# Patient Record
Sex: MALE | Race: WHITE | Employment: OTHER | URBAN - METROPOLITAN AREA
[De-identification: names, ages, dates, MRNs, and addresses within clinical notes are randomized per-mention and may not be internally consistent; named-entity substitution may affect disease eponyms.]

---

## 2022-06-01 ENCOUNTER — OFFICE VISIT (OUTPATIENT)
Dept: SURGERY | Age: 62
End: 2022-06-01
Payer: COMMERCIAL

## 2022-06-01 VITALS
BODY MASS INDEX: 25.16 KG/M2 | HEIGHT: 68 IN | WEIGHT: 166 LBS | HEART RATE: 72 BPM | SYSTOLIC BLOOD PRESSURE: 110 MMHG | TEMPERATURE: 97.7 F | DIASTOLIC BLOOD PRESSURE: 70 MMHG | RESPIRATION RATE: 18 BRPM | OXYGEN SATURATION: 98 %

## 2022-06-01 DIAGNOSIS — K40.90 RIGHT INGUINAL HERNIA: Primary | ICD-10-CM

## 2022-06-01 PROCEDURE — 99204 OFFICE O/P NEW MOD 45 MIN: CPT | Performed by: SURGERY

## 2022-06-01 RX ORDER — LOSARTAN POTASSIUM 100 MG/1
TABLET ORAL
COMMUNITY
Start: 2022-04-13

## 2022-06-01 RX ORDER — HYDROCHLOROTHIAZIDE 12.5 MG/1
TABLET ORAL
COMMUNITY
Start: 2022-05-14

## 2022-06-01 RX ORDER — OMEPRAZOLE 40 MG/1
CAPSULE, DELAYED RELEASE ORAL
COMMUNITY
Start: 2022-04-27

## 2022-06-01 RX ORDER — ROSUVASTATIN CALCIUM 10 MG/1
10 TABLET, COATED ORAL
COMMUNITY
Start: 2022-03-24

## 2022-06-01 NOTE — LETTER
6/1/2022    Patient: Bill Diaz   YOB: 1960   Date of Visit: 6/1/2022     Darylene Ober, MD  [address] [id number]  Via Fax: [phone]     Paola Jackson MD  [address] [facility] [address]  Via In [facility] [address]    Dear Darylene Ober, MD Reeta Spalding, MD,      Thank you for referring Mr. Bill Diaz to [address] for evaluation. My notes for this consultation are attached. If you have questions, please do not hesitate to call me. I look forward to following your patient along with you.       Sincerely,    Sacha Hoyos, [address]

## 2022-06-01 NOTE — PROGRESS NOTES
CC:   Chief Complaint   Patient presents with    Advice Only     bulging area right inguinal times about 6 weeks denies pain        Assessment:    ICD-10-CM ICD-9-CM    1. Right inguinal hernia  K40.90 550.90        Plan: He has a small reducible right inguinal hernia that he would like to have fixed once he looks at his schedule and can accommodate the postoperative lifting restrictions we discussed. The risks and benefits of open right inguinal hernia repair  were reviewed with the patient including infection, bleeding, need for repeat procedure, injury to surrounding structures, chronic pain and recurrent hernia. The patient's questions were answered and consent was obtained. He will call us back once he has his work schedule available. HPI:  Lacy Borjas is a 58 y.o. male who is referred by Frances Islas MD for right inguinal hernia. He states that he noticed the hernia while showering approximately 6 weeks ago and saw a lump in this area. He has been wearing a hernia belt since noticing it as he does a lot of physical labor and he is having some numbness over the area. No real pain. No shooting pain to the testicle. No difficulty voiding. He feels hernia may have been secondary to sneezing forcefully but could also be due to work. He denies any similar problems in the past.  Never had abdominal surgery. His weight has been relatively stable with approximately 15 pound weight loss over the last few months due to increased activity.     Allergies:  No Known Allergies    Medication Review:  Current Outpatient Medications on File Prior to Visit   Medication Sig Dispense Refill    hydroCHLOROthiazide (HYDRODIURIL) 12.5 mg tablet TAKE 1 ORALLY DAILY FOR BLOOD PRESSURE 30 DAY(S)      losartan (COZAAR) 100 mg tablet TAKE 1 TABLET BY MOUTH EVERY DAY FOR BLOOD PRESSURE      omeprazole (PRILOSEC) 40 mg capsule 1 ORALLY DAILY IN AM ON EMPTY STOMACH 90 DAYS      rosuvastatin (CRESTOR) 10 mg tablet Take 10 mg by mouth nightly. No current facility-administered medications on file prior to visit. Systems Review:  Review of Systems   Constitutional: Negative for activity change, appetite change, fever and unexpected weight change. Respiratory: Negative for chest tightness and shortness of breath. Cardiovascular: Negative for chest pain and palpitations. Gastrointestinal: Negative for abdominal distention and abdominal pain. Genitourinary: Negative for dysuria and scrotal swelling. PMH:  Past Medical History:   Diagnosis Date    GERD (gastroesophageal reflux disease)     Hypercholesterolemia     Hypertension        Surgical History:  Past Surgical History:   Procedure Laterality Date    HX COLONOSCOPY      HX OTHER SURGICAL      polyp removed from vocal cord       Social History:  Social History     Socioeconomic History    Marital status: UNKNOWN   Tobacco Use    Smoking status: Never Smoker    Smokeless tobacco: Never Used   Substance and Sexual Activity    Alcohol use: Yes     Alcohol/week: 5.0 - 6.0 standard drinks     Types: 5 - 6 Shots of liquor per week     Comment: weekends    Drug use: Not Currently       Family History:  History reviewed. No pertinent family history. No visits with results within 3 Month(s) from this visit. Latest known visit with results is:   No results found for any previous visit. No image results found. Physical Exam:  Visit Vitals  /70   Pulse 72   Temp 97.7 °F (36.5 °C)   Resp 18   Ht 5' 8\" (1.727 m)   Wt 75.3 kg (166 lb)   SpO2 98%   BMI 25.24 kg/m²    BMI: Body mass index is 25.24 kg/m². Physical Exam  Constitutional:       Appearance: He is normal weight. HENT:      Head: Normocephalic and atraumatic. Eyes:      Extraocular Movements: Extraocular movements intact. Conjunctiva/sclera: Conjunctivae normal.      Pupils: Pupils are equal, round, and reactive to light.    Cardiovascular:      Rate and Rhythm: Normal rate and regular rhythm. Pulmonary:      Effort: Pulmonary effort is normal.      Breath sounds: Normal breath sounds. Abdominal:      General: Abdomen is flat. Bowel sounds are normal.      Hernia: A hernia is present. Comments: Small reducible right inguinal hernia, non tender   Skin:     General: Skin is warm and dry. Neurological:      General: No focal deficit present. Mental Status: He is alert and oriented to person, place, and time. Mental status is at baseline. Psychiatric:         Mood and Affect: Mood normal.         Behavior: Behavior normal.         Thought Content: Thought content normal.         Judgment: Judgment normal.         I have reviewed the information entered by the clinical staff and/or patient and verified it as accurate or edited where necessary.      Electronically signed by:    Irwin Rangel DO, MPH

## 2022-07-20 ENCOUNTER — HOSPITAL ENCOUNTER (OUTPATIENT)
Dept: PREADMISSION TESTING | Age: 62
Discharge: HOME | End: 2022-07-20
Payer: COMMERCIAL

## 2022-07-20 DIAGNOSIS — K40.90 RIGHT INGUINAL HERNIA: ICD-10-CM

## 2022-07-20 LAB
ALBUMIN SERPL-MCNC: 4.1 G/DL (ref 3.4–5)
ALBUMIN/GLOB SERPL: 1.4 {RATIO} (ref 0.8–1.7)
ALP SERPL-CCNC: 66 U/L (ref 45–117)
ALT SERPL-CCNC: 28 U/L (ref 16–61)
ANION GAP SERPL CALC-SCNC: 4 MMOL/L (ref 3–18)
AST SERPL-CCNC: 25 U/L (ref 10–38)
ATRIAL RATE: 80 BPM
BASOPHILS # BLD: 0 K/UL (ref 0–0.1)
BASOPHILS NFR BLD: 1 % (ref 0–2)
BILIRUB SERPL-MCNC: 0.5 MG/DL (ref 0.2–1)
BUN SERPL-MCNC: 16 MG/DL (ref 7–18)
BUN/CREAT SERPL: 20 (ref 12–20)
CALCIUM SERPL-MCNC: 9.3 MG/DL (ref 8.5–10.1)
CALCULATED P AXIS, ECG09: 80 DEGREES
CALCULATED R AXIS, ECG10: 60 DEGREES
CALCULATED T AXIS, ECG11: 70 DEGREES
CHLORIDE SERPL-SCNC: 104 MMOL/L (ref 100–111)
CO2 SERPL-SCNC: 31 MMOL/L (ref 21–32)
CREAT SERPL-MCNC: 0.8 MG/DL (ref 0.6–1.3)
DIAGNOSIS, 93000: NORMAL
DIFFERENTIAL METHOD BLD: ABNORMAL
EOSINOPHIL # BLD: 0.2 K/UL (ref 0–0.4)
EOSINOPHIL NFR BLD: 3 % (ref 0–5)
ERYTHROCYTE [DISTWIDTH] IN BLOOD BY AUTOMATED COUNT: 13 % (ref 11.6–14.5)
GLOBULIN SER CALC-MCNC: 3 G/DL (ref 2–4)
GLUCOSE SERPL-MCNC: 91 MG/DL (ref 74–99)
HCT VFR BLD AUTO: 47.2 % (ref 36–48)
HGB BLD-MCNC: 16.1 G/DL (ref 13–16)
IMM GRANULOCYTES # BLD AUTO: 0 K/UL (ref 0–0.04)
IMM GRANULOCYTES NFR BLD AUTO: 0 % (ref 0–0.5)
LYMPHOCYTES # BLD: 2 K/UL (ref 0.9–3.6)
LYMPHOCYTES NFR BLD: 34 % (ref 21–52)
MCH RBC QN AUTO: 30.5 PG (ref 24–34)
MCHC RBC AUTO-ENTMCNC: 34.1 G/DL (ref 31–37)
MCV RBC AUTO: 89.4 FL (ref 78–100)
MONOCYTES # BLD: 0.5 K/UL (ref 0.05–1.2)
MONOCYTES NFR BLD: 9 % (ref 3–10)
NEUTS SEG # BLD: 3.2 K/UL (ref 1.8–8)
NEUTS SEG NFR BLD: 54 % (ref 40–73)
NRBC # BLD: 0 K/UL (ref 0–0.01)
NRBC BLD-RTO: 0 PER 100 WBC
P-R INTERVAL, ECG05: 222 MS
PLATELET # BLD AUTO: 201 K/UL (ref 135–420)
PMV BLD AUTO: 9.1 FL (ref 9.2–11.8)
POTASSIUM SERPL-SCNC: 4 MMOL/L (ref 3.5–5.5)
PROT SERPL-MCNC: 7.1 G/DL (ref 6.4–8.2)
Q-T INTERVAL, ECG07: 362 MS
QRS DURATION, ECG06: 88 MS
QTC CALCULATION (BEZET), ECG08: 417 MS
RBC # BLD AUTO: 5.28 M/UL (ref 4.35–5.65)
SODIUM SERPL-SCNC: 139 MMOL/L (ref 136–145)
VENTRICULAR RATE, ECG03: 80 BPM
WBC # BLD AUTO: 6 K/UL (ref 4.6–13.2)

## 2022-07-20 PROCEDURE — 36415 COLL VENOUS BLD VENIPUNCTURE: CPT

## 2022-07-20 PROCEDURE — 93005 ELECTROCARDIOGRAM TRACING: CPT

## 2022-07-20 PROCEDURE — 85025 COMPLETE CBC W/AUTO DIFF WBC: CPT

## 2022-07-20 PROCEDURE — 80053 COMPREHEN METABOLIC PANEL: CPT

## 2022-07-21 NOTE — PERIOP NOTES
PRE-SURGICAL INSTRUCTIONS        Patient's Name:  Reyes Pagan      YVLZH'U Date:  7/21/2022            Covid Testing Date and Time:    Surgery Date:  7/26/2022                Do NOT eat or drink anything, including candy, gum, or ice chips after midnight on 7/26, unless you have specific instructions from your surgeon or anesthesia provider to do so. You may brush your teeth before coming to the hospital.  No smoking 24 hours prior to the day of surgery. No alcohol 24 hours prior to the day of surgery. No recreational drugs for one week prior to the day of surgery. Leave all valuables, including money/purse, at home. Remove all jewelry, nail polish, acrylic nails, and makeup (including mascara); no lotions powders, deodorant, or perfume/cologne/after shave on the skin. Follow instruction for Hibiclens washes and CHG wipes from surgeon's office. Glasses/contact lenses and dentures may be worn to the hospital.  They will be removed prior to surgery. Call your doctor if symptoms of a cold or illness develop within 24-48 hours prior to your surgery. 11.  If you are having an outpatient procedure, please make arrangements for a responsible ADULT TO [address] [place] [address] and stay with you for 24 hours after your surgery. 12. ONE VISITOR in the hospital at this time for outpatient procedures. Exceptions may be made for surgical admissions, per nursing unit guidelines      Special Instructions:      Bring list of CURRENT medications. Bring any pertinent legal medical records. Take these medications the morning of surgery with a sip of water:  per office        On the day of surgery, come in the main entrance of [facility]. Let the  at the desk know you are there for surgery. A staff member will come escort you to the surgical area on the second floor.     If you have any questions or concerns, please do not hesitate to call:     (Prior to the day of surgery) [facility] department:  [phone]   (Day of surgery) Pre-Op department:  [phone]    These surgical instructions were reviewed with the patient during the PAT phone call.

## 2022-07-25 ENCOUNTER — ANESTHESIA EVENT (OUTPATIENT)
Dept: SURGERY | Age: 62
End: 2022-07-25
Payer: COMMERCIAL

## 2022-07-26 ENCOUNTER — HOSPITAL ENCOUNTER (OUTPATIENT)
Age: 62
Setting detail: OUTPATIENT SURGERY
Discharge: HOME | End: 2022-07-26
Attending: SURGERY | Admitting: SURGERY
Payer: COMMERCIAL

## 2022-07-26 ENCOUNTER — ANESTHESIA (OUTPATIENT)
Dept: SURGERY | Age: 62
End: 2022-07-26
Payer: COMMERCIAL

## 2022-07-26 VITALS
HEART RATE: 72 BPM | HEIGHT: 68 IN | TEMPERATURE: 96.7 F | OXYGEN SATURATION: 100 % | BODY MASS INDEX: 25.49 KG/M2 | RESPIRATION RATE: 20 BRPM | WEIGHT: 168.2 LBS | SYSTOLIC BLOOD PRESSURE: 147 MMHG | DIASTOLIC BLOOD PRESSURE: 82 MMHG

## 2022-07-26 DIAGNOSIS — Z87.19 S/P RIGHT INGUINAL HERNIA REPAIR: Primary | ICD-10-CM

## 2022-07-26 DIAGNOSIS — Z98.890 S/P RIGHT INGUINAL HERNIA REPAIR: Primary | ICD-10-CM

## 2022-07-26 PROCEDURE — 77030018548 HC SUT ETHBND2 J&J -B: Performed by: SURGERY

## 2022-07-26 PROCEDURE — 76210000021 HC REC RM PH II 0.5 TO 1 HR: Performed by: SURGERY

## 2022-07-26 PROCEDURE — 77030031139 HC SUT VCRL2 J&J -A: Performed by: SURGERY

## 2022-07-26 PROCEDURE — 77030002933 HC SUT MCRYL J&J -A: Performed by: SURGERY

## 2022-07-26 PROCEDURE — 76210000006 HC OR PH I REC 0.5 TO 1 HR: Performed by: SURGERY

## 2022-07-26 PROCEDURE — 77030012422 HC DRN WND COVD -A: Performed by: SURGERY

## 2022-07-26 PROCEDURE — 00830 ANES HERNIA RPR LWR ABD NOS: CPT | Performed by: NURSE ANESTHETIST, CERTIFIED REGISTERED

## 2022-07-26 PROCEDURE — 76060000033 HC ANESTHESIA 1 TO 1.5 HR: Performed by: SURGERY

## 2022-07-26 PROCEDURE — 74011000250 HC RX REV CODE- 250: Performed by: SURGERY

## 2022-07-26 PROCEDURE — 76010000149 HC OR TIME 1 TO 1.5 HR: Performed by: SURGERY

## 2022-07-26 PROCEDURE — 74011250636 HC RX REV CODE- 250/636: Performed by: NURSE ANESTHETIST, CERTIFIED REGISTERED

## 2022-07-26 PROCEDURE — 74011250637 HC RX REV CODE- 250/637: Performed by: NURSE ANESTHETIST, CERTIFIED REGISTERED

## 2022-07-26 PROCEDURE — 77030040361 HC SLV COMPR DVT MDII -B: Performed by: SURGERY

## 2022-07-26 PROCEDURE — 2709999900 HC NON-CHARGEABLE SUPPLY: Performed by: SURGERY

## 2022-07-26 PROCEDURE — 74011000250 HC RX REV CODE- 250: Performed by: NURSE ANESTHETIST, CERTIFIED REGISTERED

## 2022-07-26 PROCEDURE — 77030010507 HC ADH SKN DERMBND J&J -B: Performed by: SURGERY

## 2022-07-26 PROCEDURE — C1781 MESH (IMPLANTABLE): HCPCS | Performed by: SURGERY

## 2022-07-26 PROCEDURE — 77030011267 HC ELECTRD BLD COVD -A: Performed by: SURGERY

## 2022-07-26 PROCEDURE — 00830 ANES HERNIA RPR LWR ABD NOS: CPT | Performed by: ANESTHESIOLOGY

## 2022-07-26 PROCEDURE — 74011250636 HC RX REV CODE- 250/636: Performed by: SURGERY

## 2022-07-26 PROCEDURE — 77030018836 HC SOL IRR NACL ICUM -A: Performed by: SURGERY

## 2022-07-26 DEVICE — MESH SURG W3.5XL6IN POLY SELF FIXATING RECT W/ RESRB PLA: Type: IMPLANTABLE DEVICE | Site: GROIN | Status: FUNCTIONAL

## 2022-07-26 RX ORDER — FENTANYL CITRATE 50 UG/ML
50 INJECTION, SOLUTION INTRAMUSCULAR; INTRAVENOUS AS NEEDED
Status: CANCELLED | OUTPATIENT
Start: 2022-07-26

## 2022-07-26 RX ORDER — SODIUM CHLORIDE 0.9 % (FLUSH) 0.9 %
5-40 SYRINGE (ML) INJECTION EVERY 8 HOURS
Status: DISCONTINUED | OUTPATIENT
Start: 2022-07-26 | End: 2022-07-26 | Stop reason: HOSPADM

## 2022-07-26 RX ORDER — FENTANYL CITRATE 50 UG/ML
INJECTION, SOLUTION INTRAMUSCULAR; INTRAVENOUS AS NEEDED
Status: DISCONTINUED | OUTPATIENT
Start: 2022-07-26 | End: 2022-07-26 | Stop reason: HOSPADM

## 2022-07-26 RX ORDER — SODIUM CHLORIDE, SODIUM LACTATE, POTASSIUM CHLORIDE, CALCIUM CHLORIDE 600; 310; 30; 20 MG/100ML; MG/100ML; MG/100ML; MG/100ML
50 INJECTION, SOLUTION INTRAVENOUS CONTINUOUS
Status: CANCELLED | OUTPATIENT
Start: 2022-07-26

## 2022-07-26 RX ORDER — KETOROLAC TROMETHAMINE 15 MG/ML
INJECTION, SOLUTION INTRAMUSCULAR; INTRAVENOUS AS NEEDED
Status: DISCONTINUED | OUTPATIENT
Start: 2022-07-26 | End: 2022-07-26 | Stop reason: HOSPADM

## 2022-07-26 RX ORDER — PROPOFOL 10 MG/ML
INJECTION, EMULSION INTRAVENOUS AS NEEDED
Status: DISCONTINUED | OUTPATIENT
Start: 2022-07-26 | End: 2022-07-26 | Stop reason: HOSPADM

## 2022-07-26 RX ORDER — FAMOTIDINE 20 MG/1
20 TABLET, FILM COATED ORAL ONCE
Status: COMPLETED | OUTPATIENT
Start: 2022-07-26 | End: 2022-07-26

## 2022-07-26 RX ORDER — SODIUM CHLORIDE, SODIUM LACTATE, POTASSIUM CHLORIDE, CALCIUM CHLORIDE 600; 310; 30; 20 MG/100ML; MG/100ML; MG/100ML; MG/100ML
25 INJECTION, SOLUTION INTRAVENOUS CONTINUOUS
Status: DISCONTINUED | OUTPATIENT
Start: 2022-07-26 | End: 2022-07-26 | Stop reason: HOSPADM

## 2022-07-26 RX ORDER — ONDANSETRON 2 MG/ML
4 INJECTION INTRAMUSCULAR; INTRAVENOUS ONCE
Status: CANCELLED | OUTPATIENT
Start: 2022-07-26 | End: 2022-07-26

## 2022-07-26 RX ORDER — LIDOCAINE HYDROCHLORIDE 10 MG/ML
0.1 INJECTION, SOLUTION EPIDURAL; INFILTRATION; INTRACAUDAL; PERINEURAL AS NEEDED
Status: DISCONTINUED | OUTPATIENT
Start: 2022-07-26 | End: 2022-07-26 | Stop reason: HOSPADM

## 2022-07-26 RX ORDER — HYDROMORPHONE HYDROCHLORIDE 2 MG/ML
0.5 INJECTION, SOLUTION INTRAMUSCULAR; INTRAVENOUS; SUBCUTANEOUS
Status: CANCELLED | OUTPATIENT
Start: 2022-07-26

## 2022-07-26 RX ORDER — DEXAMETHASONE SODIUM PHOSPHATE 4 MG/ML
INJECTION, SOLUTION INTRA-ARTICULAR; INTRALESIONAL; INTRAMUSCULAR; INTRAVENOUS; SOFT TISSUE AS NEEDED
Status: DISCONTINUED | OUTPATIENT
Start: 2022-07-26 | End: 2022-07-26 | Stop reason: HOSPADM

## 2022-07-26 RX ORDER — SODIUM CHLORIDE 0.9 % (FLUSH) 0.9 %
5-40 SYRINGE (ML) INJECTION AS NEEDED
Status: DISCONTINUED | OUTPATIENT
Start: 2022-07-26 | End: 2022-07-26 | Stop reason: HOSPADM

## 2022-07-26 RX ORDER — MIDAZOLAM HYDROCHLORIDE 1 MG/ML
INJECTION, SOLUTION INTRAMUSCULAR; INTRAVENOUS AS NEEDED
Status: DISCONTINUED | OUTPATIENT
Start: 2022-07-26 | End: 2022-07-26 | Stop reason: HOSPADM

## 2022-07-26 RX ORDER — EPHEDRINE SULFATE/0.9% NACL/PF 50 MG/5 ML
SYRINGE (ML) INTRAVENOUS AS NEEDED
Status: DISCONTINUED | OUTPATIENT
Start: 2022-07-26 | End: 2022-07-26 | Stop reason: HOSPADM

## 2022-07-26 RX ORDER — LIDOCAINE HYDROCHLORIDE 20 MG/ML
INJECTION, SOLUTION EPIDURAL; INFILTRATION; INTRACAUDAL; PERINEURAL AS NEEDED
Status: DISCONTINUED | OUTPATIENT
Start: 2022-07-26 | End: 2022-07-26 | Stop reason: HOSPADM

## 2022-07-26 RX ORDER — ONDANSETRON 2 MG/ML
INJECTION INTRAMUSCULAR; INTRAVENOUS AS NEEDED
Status: DISCONTINUED | OUTPATIENT
Start: 2022-07-26 | End: 2022-07-26 | Stop reason: HOSPADM

## 2022-07-26 RX ORDER — HYDROCODONE BITARTRATE AND ACETAMINOPHEN 5; 325 MG/1; MG/1
1 TABLET ORAL
Qty: 20 TABLET | Refills: 0 | Status: SHIPPED | OUTPATIENT
Start: 2022-07-26 | End: 2022-07-31

## 2022-07-26 RX ADMIN — KETOROLAC TROMETHAMINE 15 MG: 15 INJECTION, SOLUTION INTRAMUSCULAR; INTRAVENOUS at 11:13

## 2022-07-26 RX ADMIN — FENTANYL CITRATE 50 MCG: 50 INJECTION, SOLUTION INTRAMUSCULAR; INTRAVENOUS at 11:28

## 2022-07-26 RX ADMIN — CEFAZOLIN SODIUM 2 G: 1 INJECTION, POWDER, FOR SOLUTION INTRAMUSCULAR; INTRAVENOUS at 10:30

## 2022-07-26 RX ADMIN — MIDAZOLAM HYDROCHLORIDE 2 MG: 2 INJECTION, SOLUTION INTRAMUSCULAR; INTRAVENOUS at 10:18

## 2022-07-26 RX ADMIN — LIDOCAINE HYDROCHLORIDE 80 MG: 20 INJECTION, SOLUTION EPIDURAL; INFILTRATION; INTRACAUDAL; PERINEURAL at 10:22

## 2022-07-26 RX ADMIN — FENTANYL CITRATE 25 MCG: 50 INJECTION, SOLUTION INTRAMUSCULAR; INTRAVENOUS at 10:56

## 2022-07-26 RX ADMIN — Medication 20 MG: at 11:03

## 2022-07-26 RX ADMIN — FAMOTIDINE 20 MG: 20 TABLET ORAL at 08:27

## 2022-07-26 RX ADMIN — SODIUM CHLORIDE, POTASSIUM CHLORIDE, SODIUM LACTATE AND CALCIUM CHLORIDE 25 ML/HR: 600; 310; 30; 20 INJECTION, SOLUTION INTRAVENOUS at 08:31

## 2022-07-26 RX ADMIN — FENTANYL CITRATE 75 MCG: 50 INJECTION, SOLUTION INTRAMUSCULAR; INTRAVENOUS at 11:16

## 2022-07-26 RX ADMIN — PROPOFOL 160 MG: 10 INJECTION, EMULSION INTRAVENOUS at 10:22

## 2022-07-26 RX ADMIN — FENTANYL CITRATE 50 MCG: 50 INJECTION, SOLUTION INTRAMUSCULAR; INTRAVENOUS at 10:27

## 2022-07-26 RX ADMIN — ONDANSETRON 4 MG: 2 INJECTION INTRAMUSCULAR; INTRAVENOUS at 11:13

## 2022-07-26 RX ADMIN — DEXAMETHASONE SODIUM PHOSPHATE 8 MG: 4 INJECTION, SOLUTION INTRAMUSCULAR; INTRAVENOUS at 10:26

## 2022-07-26 NOTE — H&P
Advice Only        bulging area right inguinal times about 6 weeks denies pain         Assessment:      ICD-10-CM ICD-9-CM     1. Right inguinal hernia K40.90 550.90           Plan: He has a small reducible right inguinal hernia that he would like to have fixed once he looks at his schedule and can accommodate the postoperative lifting restrictions we discussed. The risks and benefits of open right inguinal hernia repair  were reviewed with the patient including infection, bleeding, need for repeat procedure, injury to surrounding structures, chronic pain and recurrent hernia. The patient's questions were answered and consent was obtained. He will call us back once he has his work schedule available. HPI:  Cielo Elena is a 58 y.o. male who is referred by Yi Doe MD for right inguinal hernia. He states that he noticed the hernia while showering approximately 6 weeks ago and saw a lump in this area. He has been wearing a hernia belt since noticing it as he does a lot of physical labor and he is having some numbness over the area. No real pain. No shooting pain to the testicle. No difficulty voiding. He feels hernia may have been secondary to sneezing forcefully but could also be due to work. He denies any similar problems in the past.  Never had abdominal surgery. His weight has been relatively stable with approximately 15 pound weight loss over the last few months due to increased activity.      Allergies:  No Known Allergies     Medication Review:         Current Outpatient Medications on File Prior to Visit   Medication Sig Dispense Refill    hydroCHLOROthiazide (HYDRODIURIL) 12.5 mg tablet TAKE 1 ORALLY DAILY FOR BLOOD PRESSURE 30 DAY(S)        losartan (COZAAR) 100 mg tablet TAKE 1 TABLET BY MOUTH EVERY DAY FOR BLOOD PRESSURE        omeprazole (PRILOSEC) 40 mg capsule 1 ORALLY DAILY IN AM ON EMPTY STOMACH 90 DAYS        rosuvastatin (CRESTOR) 10 mg tablet Take 10 mg by mouth nightly. No current facility-administered medications on file prior to visit. Systems Review:  Review of Systems  Constitutional: Negative for activity change, appetite change, fever and unexpected weight change. Respiratory: Negative for chest tightness and shortness of breath. Cardiovascular: Negative for chest pain and palpitations. Gastrointestinal: Negative for abdominal distention and abdominal pain. Genitourinary: Negative for dysuria and scrotal swelling. PMH:       Past Medical History:   Diagnosis Date    GERD (gastroesophageal reflux disease)      Hypercholesterolemia      Hypertension           Surgical History:        Past Surgical History:   Procedure Laterality Date    HX COLONOSCOPY        HX OTHER SURGICAL         polyp removed from vocal cord         Social History:  Social History            Socioeconomic History    Marital status: UNKNOWN   Tobacco Use    Smoking status: Never Smoker    Smokeless tobacco: Never Used   Substance and Sexual Activity    Alcohol use: Yes       Alcohol/week: 5.0 - 6.0 standard drinks       Types: 5 - 6 Shots of liquor per week       Comment: weekends    Drug use: Not Currently         Family History:  History reviewed. No pertinent family history. No visits with results within 3 Month(s) from this visit. Latest known visit with results is:   No results found for any previous visit. No image results found. Physical Exam:  Visit Vitals  /70   Pulse 72   Temp 97.7 °F (36.5 °C)   Resp 18   Ht 5' 8\" (1.727 m)   Wt 75.3 kg (166 lb)   SpO2 98%   BMI 25.24 kg/m²    BMI: Body mass index is 25.24 kg/m². Physical Exam  Constitutional:       Appearance: He is normal weight. HENT:     Head: Normocephalic and atraumatic. Eyes:     Extraocular Movements: Extraocular movements intact. Conjunctiva/sclera: Conjunctivae normal.      Pupils: Pupils are equal, round, and reactive to light.    Cardiovascular:     Rate and Rhythm: Normal rate and regular rhythm. Pulmonary:     Effort: Pulmonary effort is normal.      Breath sounds: Normal breath sounds. Abdominal:      General: Abdomen is flat. Bowel sounds are normal.      Hernia: A hernia is present. Comments: Small reducible right inguinal hernia, non tender   Skin:     General: Skin is warm and dry. Neurological:     General: No focal deficit present. Mental Status: He is alert and oriented to person, place, and time. Mental status is at baseline. Psychiatric:         Mood and Affect: Mood normal.         Behavior: Behavior normal.         Thought Content: Thought content normal.         Judgment: Judgment normal.           I have reviewed the information entered by the clinical staff and/or patient and verified it as accurate or edited where necessary.      Electronically signed by:

## 2022-07-26 NOTE — ANESTHESIA POSTPROCEDURE EVALUATION
Procedure(s):  OPEN RIGHT INGUINAL HERNIA REPAIR WITH MESH.     general    Anesthesia Post Evaluation      Multimodal analgesia: multimodal analgesia used between 6 hours prior to anesthesia start to PACU discharge  Patient location during evaluation: bedside  Patient participation: complete - patient participated  Level of consciousness: awake  Pain management: adequate  Airway patency: patent  Anesthetic complications: no  Cardiovascular status: stable  Respiratory status: acceptable  Hydration status: acceptable  Post anesthesia nausea and vomiting:  controlled      INITIAL Post-op Vital signs:   Vitals Value Taken Time   /81 07/26/22 1215   Temp 36.1 °C (97 °F) 07/26/22 1200   Pulse 85 07/26/22 1218   Resp 12 07/26/22 1218   SpO2 94 % 07/26/22 1218

## 2022-07-26 NOTE — ANESTHESIA PREPROCEDURE EVALUATION
Relevant Problems   No relevant active problems       Anesthetic History   No history of anesthetic complications            Review of Systems / Medical History  Patient summary reviewed and pertinent labs reviewed    Pulmonary  Within defined limits                 Neuro/Psych   Within defined limits           Cardiovascular    Hypertension: well controlled                   GI/Hepatic/Renal     GERD           Endo/Other  Within defined limits           Other Findings              Physical Exam    Airway  Mallampati: II  TM Distance: 4 - 6 cm  Neck ROM: normal range of motion   Mouth opening: Normal     Cardiovascular               Dental  No notable dental hx       Pulmonary                 Abdominal  GI exam deferred       Other Findings            Anesthetic Plan    ASA: 2  Anesthesia type: general          Induction: Intravenous  Anesthetic plan and risks discussed with: Patient

## 2022-07-26 NOTE — DISCHARGE INSTRUCTIONS
Post Operative Discharge Instructions    No driving for 24 hours after surgery and off of prescription pain medication. Avoid activities that bump or cause jarring movements at the surgical site for 10 days. No lifting more than 10-15 pounds for 6 weeks after surgery or until cleared for activity at your follow up. Walking is encouraged after surgery. Stairs are ok to climb. DIET:    Diet as tolerated. Start with liquids then advance your diet based on how you fell. No alcoholic beverages for 24 hours after surgery or while on antibiotics or pain mdications. Drink plenty of water. MEDICATIONS:    Use daily stool softners (over the counter such as Colace or Senekot) while on pain medications. Resume pre-operative medications. If you are on any blood thinners see special instructions below. Use prescriptions given or Tylenol, Ibuprofen as needed for pain. Do not use more than 4000mg of Tylenol (acetaminophen) per day. Be aware this may be  in your prescription medication as well. Be aware narcotic prescriptions are tightly controlled in the state of [place]. If requiring more than one refill, a follow up appointment will be required. WOUND CARE:      You have skin glue on your incision, you may shower in 24 hours and pat dry. Glue will fall off on it's own    Do not tub bathe, swim, or soak incisions until cleared to do so at your follow up. Ice bag to the affected area; 20 minutes on and 20 minutes off if desired. FOLLOW UP CARE:    You should have an appointment scheduled within 14 days after surgery. If this is not yet scheduled, call the office. Any forms that you need filled out regarding your medical care can be brought to the office at follow up appointment of faxed to: [id number] [employer] IF:  Temperature is over 101 degrees, a slight fever can be normal 24-48 hour after surgery.   Nausea & vomiting that persists more than 24 hours after surgery. Your wound appears very red, hot, painful or swollen. Excessive bleeding occurs form the incision. *Between the hours 9-5 Monday-Friday please call the office at [phone]. If you do not receive a call back the same day, please do not hesitate to call my cell phone at [phone]    *If there is a medical emergency please go to the nearest emergency room immediately and do not hesitate to call my cell phone    *Weekends, after hours please call my cell phone       DISCHARGE SUMMARY from Nurse    PATIENT INSTRUCTIONS:    After general anesthesia or intravenous sedation, for 24 hours or while taking prescription Narcotics:  Limit your activities  Do not drive and operate hazardous machinery  Do not make important personal or business decisions  Do  not drink alcoholic beverages  If you have not urinated within 8 hours after discharge, please contact your surgeon on call. Report the following to your surgeon:  Excessive pain, swelling, redness or odor of or around the surgical area  Temperature over 100.5  Nausea and vomiting lasting longer than 4 hours or if unable to take medications  Any signs of decreased circulation or nerve impairment to extremity: change in color, persistent  numbness, tingling, coldness or increase pain  Any questions    What to do at Home:      These are general instructions for a healthy lifestyle:    No smoking/ No tobacco products/ Avoid exposure to second hand smoke  Surgeon General's Warning:  Quitting smoking now greatly reduces serious risk to your health.     Obesity, smoking, and sedentary lifestyle greatly increases your risk for illness    A healthy diet, regular physical exercise & weight monitoring are important for maintaining a healthy lifestyle    You may be retaining fluid if you have a history of heart failure or if you experience any of the following symptoms:  Weight gain of 3 pounds or more overnight or 5 pounds in a week, increased swelling in our hands or feet or shortness of breath while lying flat in bed. Please call your doctor as soon as you notice any of these symptoms; do not wait until your next office visit. The discharge information has been reviewed with the patient. The patient verbalized understanding. Discharge medications reviewed with the patient and appropriate educational materials and side effects teaching were provided.   ___________________________________________________________________________________________________________________________________

## 2022-07-26 NOTE — OP NOTES
Patient Name: Leeroy Eves: 22     : 1960     AGE: 58 y.o. Anesthesiologist: Anesthesiologist: Yumiko Erazo MD  CRNA: Chanda Salazar CRNA     Anesthesia: General     PreOp DX: Right inguinal hernia [K40.90]     PostOp DX: same    Procedure: Open right inguinal hernia repair with mesh    Procedure Details: After informed consent was obtained the patient was taken to the operating room and placed in the supine position. General anesthesia was administered by the anesthetist to titrate to effect. The right groin was prepped and draped in the usual sterile fashion and a timeout procedure was performed. Next using a 15 blade scalpel an oblique incision was made superior to the inguinal ligament. Bovie electrocautery was used to dissect through campers and vanessa's fascia. The external oblique aponeurosis was then sharply incised through the external ring. A penrose drain was then placed around the spermatic cord and contents. A very large hernia sac was then  from the spermatic cord and reduced back into the abdomen after using a combination of blunt and bovie dissection. The inguinal floor was very patulous and reapproximated with 0 ethibond suture to ensure contents stayed reduced. A piece of progrip mesh was then cut to fit widely over the floor, pubic tubercle and then around the cord. It was secured at the pubic tubercle and inguinal ligament and internal oblique. The wound was irrigated and suctioned dry and found to be hemostatic. The external oblique aponeurosis was then closed with a 2-0 vicryl suture recreating the external ring. Scarpas fascia was closed with 2-0 vicryl in a simple interrupted fashion. The deep dermis was reapproximated with 3-0 vicryl in a simple interrupted fashion. The skin incision was then closed with 4-0 Monocryl in a subcuticular manner. Dermabond dressing was then applied.   The patient was subsequently extubated, tolerated the procedure well and sent to recovery in stable condition.       Estimated Blood Loss:  10cc    Specimens: * No specimens in log *             Complications: None           Disposition: extubated, tolerated procedure well            Condition: Stable    Elisabet Barnett DO

## 2022-08-11 ENCOUNTER — OFFICE VISIT (OUTPATIENT)
Dept: SURGERY | Age: 62
End: 2022-08-11
Payer: COMMERCIAL

## 2022-08-11 VITALS
WEIGHT: 168 LBS | HEART RATE: 70 BPM | HEIGHT: 68 IN | BODY MASS INDEX: 25.46 KG/M2 | DIASTOLIC BLOOD PRESSURE: 96 MMHG | RESPIRATION RATE: 17 BRPM | SYSTOLIC BLOOD PRESSURE: 154 MMHG | TEMPERATURE: 98.1 F | OXYGEN SATURATION: 99 %

## 2022-08-11 DIAGNOSIS — Z98.890 S/P RIGHT INGUINAL HERNIA REPAIR: Primary | ICD-10-CM

## 2022-08-11 DIAGNOSIS — Z87.19 S/P RIGHT INGUINAL HERNIA REPAIR: Primary | ICD-10-CM

## 2022-08-11 PROCEDURE — 99024 POSTOP FOLLOW-UP VISIT: CPT | Performed by: SURGERY

## 2022-08-11 NOTE — PROGRESS NOTES
CC: No chief complaint on file. Assessment:    ICD-10-CM ICD-9-CM    1. S/P right inguinal hernia repair  Z98.890 V45.89     Z87.19            Plan: He is doing as expected after hernia repair. He is to continue with lifting restrictions for another 4 weeks and then may resume activity as tolerated after that time. There is no need for additional follow-up unless he has any questions or concerns in the future. He agrees with this plan. HPI:  Matheus Martinez is a 58 y.o. male who is here today for follow up for open repair of right inguinal hernia on 7/26/22. Overall he is doing well with minimal pain. No fevers, chills or drainage from his incisions. Allergies:  No Known Allergies    Medication Review:  Current Outpatient Medications on File Prior to Visit   Medication Sig Dispense Refill    hydroCHLOROthiazide (HYDRODIURIL) 12.5 mg tablet TAKE 1 ORALLY DAILY FOR BLOOD PRESSURE 30 DAY(S)      losartan (COZAAR) 100 mg tablet TAKE 1 TABLET BY MOUTH EVERY DAY FOR BLOOD PRESSURE      omeprazole (PRILOSEC) 40 mg capsule 1 ORALLY DAILY IN AM ON EMPTY STOMACH 90 DAYS      rosuvastatin (CRESTOR) 10 mg tablet Take 10 mg by mouth nightly. No current facility-administered medications on file prior to visit. Systems Review:  Review of Systems   Constitutional:  Negative for fatigue and fever. Gastrointestinal:  Negative for abdominal pain. PMH:  Past Medical History:   Diagnosis Date    GERD (gastroesophageal reflux disease)     Hypercholesterolemia     Hypertension        Surgical History:  Past Surgical History:   Procedure Laterality Date    HX COLONOSCOPY      HX OTHER SURGICAL      polyp removed from vocal cord       Social History:  Social History     Socioeconomic History    Marital status:    Tobacco Use    Smoking status: Never    Smokeless tobacco: Never   Substance and Sexual Activity    Alcohol use:  Yes     Alcohol/week: 5.0 - 6.0 standard drinks     Types: 5 - 6 Shots of liquor per week     Comment: weekends    Drug use: Never       Family History:  No family history on file. Hospital Outpatient Visit on 07/20/2022   Component Date Value Ref Range Status    Ventricular Rate 07/20/2022 80  BPM Final    Atrial Rate 07/20/2022 80  BPM Final    P-R Interval 07/20/2022 222  ms Final    QRS Duration 07/20/2022 88  ms Final    Q-T Interval 07/20/2022 362  ms Final    QTC Calculation (Bezet) 07/20/2022 417  ms Final    Calculated P Axis 07/20/2022 80  degrees Final    Calculated R Axis 07/20/2022 60  degrees Final    Calculated T Axis 07/20/2022 70  degrees Final    Diagnosis 07/20/2022    Final                    Value:Sinus rhythm with 1st degree AV block  Otherwise normal ECG  No previous ECGs available  Confirmed by Tal Leonard MD, ----- ([id number]) on 7/20/2022 5:30:18 PM      Sodium 07/20/2022 139  136 - 145 mmol/L Final    Potassium 07/20/2022 4.0  3.5 - 5.5 mmol/L Final    Chloride 07/20/2022 104  100 - 111 mmol/L Final    CO2 07/20/2022 31  21 - 32 mmol/L Final    Anion gap 07/20/2022 4  3.0 - 18 mmol/L Final    Glucose 07/20/2022 91  74 - 99 mg/dL Final    BUN 07/20/2022 16  7.0 - 18 MG/DL Final    Creatinine 07/20/2022 0.80  0.6 - 1.3 MG/DL Final    BUN/Creatinine ratio 07/20/2022 20  12 - 20   Final    GFR est AA 07/20/2022 >60  >60 ml/min/1.73m2 Final    GFR est non-AA 07/20/2022 >60  >60 ml/min/1.73m2 Final    Comment: (NOTE)  Estimated GFR is calculated using the Modification of Diet in Renal   Disease (MDRD) Study equation, reported for both  Americans   (GFRAA) and non- Americans (GFRNA), and normalized to 1.73m2   body surface area. The physician must decide which value applies to   the patient. The MDRD study equation should only be used in   individuals age 25 or older.  It has not been validated for the   following: pregnant women, patients with serious comorbid conditions,   or on certain medications, or persons with extremes of body size,   muscle mass, or nutritional status. Calcium 07/20/2022 9.3  8.5 - 10.1 MG/DL Final    Bilirubin, total 07/20/2022 0.5  0.2 - 1.0 MG/DL Final    ALT (SGPT) 07/20/2022 28  16 - 61 U/L Final    AST (SGOT) 07/20/2022 25  10 - 38 U/L Final    Alk. phosphatase 07/20/2022 66  45 - 117 U/L Final    Protein, total 07/20/2022 7.1  6.4 - 8.2 g/dL Final    Albumin 07/20/2022 4.1  3.4 - 5.0 g/dL Final    Globulin 07/20/2022 3.0  2.0 - 4.0 g/dL Final    A-G Ratio 07/20/2022 1.4  0.8 - 1.7   Final    WBC 07/20/2022 6.0  4.6 - 13.2 K/uL Final    RBC 07/20/2022 5.28  4.35 - 5.65 M/uL Final    HGB 07/20/2022 16.1 (A) 13.0 - 16.0 g/dL Final    HCT 07/20/2022 47.2  36.0 - 48.0 % Final    MCV 07/20/2022 89.4  78.0 - 100.0 FL Final    MCH 07/20/2022 30.5  24.0 - 34.0 PG Final    MCHC 07/20/2022 34.1  31.0 - 37.0 g/dL Final    RDW 07/20/2022 13.0  11.6 - 14.5 % Final    PLATELET 98/65/1447 090  135 - 420 K/uL Final    MPV 07/20/2022 9.1 (A) 9.2 - 11.8 FL Final    NRBC 07/20/2022 0.0  0  WBC Final    ABSOLUTE NRBC 07/20/2022 0.00  0.00 - 0.01 K/uL Final    NEUTROPHILS 07/20/2022 54  40 - 73 % Final    LYMPHOCYTES 07/20/2022 34  21 - 52 % Final    MONOCYTES 07/20/2022 9  3 - 10 % Final    EOSINOPHILS 07/20/2022 3  0 - 5 % Final    BASOPHILS 07/20/2022 1  0 - 2 % Final    IMMATURE GRANULOCYTES 07/20/2022 0  0.0 - 0.5 % Final    ABS. NEUTROPHILS 07/20/2022 3.2  1.8 - 8.0 K/UL Final    ABS. LYMPHOCYTES 07/20/2022 2.0  0.9 - 3.6 K/UL Final    ABS. MONOCYTES 07/20/2022 0.5  0.05 - 1.2 K/UL Final    ABS. EOSINOPHILS 07/20/2022 0.2  0.0 - 0.4 K/UL Final    ABS. BASOPHILS 07/20/2022 0.0  0.0 - 0.1 K/UL Final    ABS. IMM. GRANS. 07/20/2022 0.0  0.00 - 0.04 K/UL Final    DF 07/20/2022 AUTOMATED    Final       No image results found. Physical Exam:  There were no vitals taken for this visit. BMI: There is no height or weight on file to calculate BMI. Physical Exam  Skin:     General: Skin is warm and dry.       Comments: Right inguinal incision well healed, no recurrent hernia       I have reviewed the information entered by the clinical staff and/or patient and verified it as accurate or edited where necessary.      Electronically signed by:    Linda Pickard DO, MPH

## 2022-08-11 NOTE — PROGRESS NOTES
Colette Garner is a 58 y.o. male  Chief Complaint   Patient presents with    Post OP Follow Up     7/26/2022 open right inguinal hernia     Visit Vitals  BP (!) 154/96   Pulse 70   Temp 98.1 °F (36.7 °C)   Resp 17   Ht 5' 8\" (1.727 m)   Wt 168 lb (76.2 kg)   SpO2 99%   BMI 25.54 kg/m²     Mr. Elroy Wilcox has been given the following recommendations today due to his elevated BP reading: referred to Alternative/PCP. 1. Have you been to the ER, urgent care clinic since your last visit? Hospitalized since your last visit? No    2. Have you seen or consulted any other health care providers outside of the [address] [place] [address] since your last visit? Include any pap smears or colon screening.  No

## (undated) DEVICE — REM POLYHESIVE ADULT PATIENT RETURN ELECTRODE: Brand: VALLEYLAB

## (undated) DEVICE — SUTURE VCRL SZ 3-0 L27IN ABSRB UD L26MM SH 1/2 CIR J416H

## (undated) DEVICE — SUTURE ETHBND EXCEL SZ 0 L18IN NONABSORBABLE GRN L36MM CT-1 CX21D

## (undated) DEVICE — PENROSE TUBING RADIOPAQUE: Brand: ARGYLE

## (undated) DEVICE — DRAPE,REIN 53X77,STERILE: Brand: MEDLINE

## (undated) DEVICE — 3M™ STERI-STRIP™ REINFORCED ADHESIVE SKIN CLOSURES, R1549, 1/2 IN X 2 IN (12 MM X 50 MM), 6 STRIPS/ENVELOPE: Brand: 3M™ STERI-STRIP™

## (undated) DEVICE — SUTURE VCRL SZ 2-0 L27IN ABSRB UD L26MM SH 1/2 CIR J417H

## (undated) DEVICE — SPONGE DISSECT PNUT SM 3/8IN -- 5/PK

## (undated) DEVICE — GARMENT,MEDLINE,DVT,INT,CALF,MED, GEN2: Brand: MEDLINE

## (undated) DEVICE — KIT CLN UP BON SECOURS MARYV

## (undated) DEVICE — DRAPE TOWEL: Brand: CONVERTORS

## (undated) DEVICE — DRAPE,TOP,102X53,STERILE: Brand: MEDLINE

## (undated) DEVICE — DERMABOND SKIN ADH 0.7ML -- DERMABOND ADVANCED 12/BX

## (undated) DEVICE — MASTISOL ADHESIVE LIQ 2/3ML

## (undated) DEVICE — PACK PROCEDURE SURG MAJ W/ BASIN LF

## (undated) DEVICE — INSULATED BLADE ELECTRODE: Brand: EDGE

## (undated) DEVICE — SUTURE MCRYL SZ 4-0 L27IN ABSRB UD L24MM PS-1 3/8 CIR PRIM Y935H

## (undated) DEVICE — INTENDED FOR TISSUE SEPARATION, AND OTHER PROCEDURES THAT REQUIRE A SHARP SURGICAL BLADE TO PUNCTURE OR CUT.: Brand: BARD-PARKER SAFETY BLADES SIZE 15, STERILE

## (undated) DEVICE — SOLUTION IV 1000ML 0.9% SOD CHL

## (undated) DEVICE — GLOVE SURG SZ 65 L12IN FNGR THK79MIL GRN LTX FREE

## (undated) DEVICE — GLOVE SURG SZ 6 CRM LTX FREE POLYISOPRENE POLYMER BEAD ANTI